# Patient Record
Sex: MALE | Race: WHITE | NOT HISPANIC OR LATINO | Employment: OTHER | URBAN - METROPOLITAN AREA
[De-identification: names, ages, dates, MRNs, and addresses within clinical notes are randomized per-mention and may not be internally consistent; named-entity substitution may affect disease eponyms.]

---

## 2020-06-24 ENCOUNTER — OFFICE VISIT CONVERTED (OUTPATIENT)
Dept: NEUROSURGERY | Facility: CLINIC | Age: 60
End: 2020-06-24
Attending: NEUROLOGICAL SURGERY

## 2020-07-10 ENCOUNTER — HOSPITAL ENCOUNTER (OUTPATIENT)
Dept: PHYSICAL THERAPY | Facility: CLINIC | Age: 60
Setting detail: RECURRING SERIES
Discharge: HOME | End: 2020-10-15
Attending: NEUROLOGICAL SURGERY

## 2020-08-28 ENCOUNTER — OFFICE VISIT CONVERTED (OUTPATIENT)
Dept: NEUROSURGERY | Facility: CLINIC | Age: 60
End: 2020-08-28
Attending: PHYSICIAN ASSISTANT

## 2021-05-10 NOTE — H&P
History and Physical      Patient Name: Harry Aponte   Patient ID: [id number]   Sex: Male   YOB: 1960    Referring Provider: Mildred ONOFRE    Visit Date: June 24, 2020    Provider: Dom Saenz MD   Location: [facility]   Location Address: [address] [place] [address]  [id number]   Location Phone: [phone]          Chief Complaint  · Back and left leg pain      History Of Present Illness  The patient is a 60 year old /White male, who presents on referral from Mildred ONOFRE, for a neurosurgical evaluation of low back pain.   The pain developed gradually several years ago. Then on first of Zelda develop dramatically increased back pain. currently improving. It is 2-3/10 now instead of 9-10/10 has a sharp and quality and radiates into the left anterior thigh and shin, but mainly in shin distribution. The pain has been waxing and waning in severity. The pain tends to be maximal at night. The patient states the pain is aggravated by walking long distances and climbing stairs. It is alleviated by fetal position, change in position..   He denies paresthesias in the right leg. The patient has no prior history of neck or back surgery.   RECENT INTERVENTIONS:  He has been previously treated with NSAIDs and and gabapentin..   INFORMATION REVIEWED:  The following information was reviewed: radiology reports and images and referring physicians notes. Flexion/extension lumbar radiographs and MRI of the lumbar spine revealed spinal stenosis, grade II spondylolisthesis, and degenerative disk disease. The stenosis is present at L4-5. The grade II spondylolisthesis involves the L4 vertebral body. The degenerative disc disease is present at multiple levels.       Past Medical History  Hyperlipidemia; Hypertension; Renal Calculus; Spondylolisthesis , lumbar         Past Surgical History  Colonoscopy         Medication List  amlodipine 5 mg oral tablet; Aspir-81 81 mg oral  "tablet,delayed release (DR/EC); citric acid (bulk) miscellaneous powder; gabapentin 300 mg oral capsule; naproxen 500 mg oral tablet; pravastatin 80 mg oral tablet         Allergy List  NO KNOWN DRUG ALLERGIES         Family Medical History  -None         Social History  Caffeine (Current every day); Second hand smoke exposure (Never); Tobacco (Former)         Review of Systems  · Constitutional  o Denies  o : chills, excessive sweating, fatigue, fever, sycope/passing out, weight gain, weight loss  · Eyes  o Denies  o : changes in vision, blurry vision, double vision  · HENT  o Denies  o : loss of hearing, ringing in the ears, ear aches, sore throat, nasal congestion, sinus pain, nose bleeds, seasonal allergies  · Cardiovascular  o Denies  o : blood clots, swollen legs, anemia, easy burising or bleeding, transfusions  · Respiratory  o Denies  o : shortness of breath, dry cough, productive cough, pneumonia, COPD  · Gastrointestinal  o Denies  o : difficulty swallowing, reflux  · Genitourinary  o Denies  o : incontinence  · Neurologic  o Admits  o : numbness/tingling/paresthesia , weakness  o Denies  o : headache, seizure, stroke, tremor, loss of balance, falls, dizziness/vertigo, difficulty with sleep, difficulty with coordination, difficulty with dexterity  · Musculoskeletal  o Admits  o : weakness, pain radiating in leg, low back pain  o Denies  o : neck stiffness/pain, swollen lymph nodes, muscle aches, joint pain, spasms, sciatica, pain radiating in arm  · Endocrine  o Denies  o : diabetes, thyroid disorder  · Psychiatric  o Denies  o : anxiety, depression      Vitals  Date Time BP Position Site L\R Cuff Size HR RR TEMP (F) WT  HT  BMI kg/m2 BSA m2 O2 Sat        06/24/2020 01:08 PM        98.1 206lbs 3oz 6'  1\" 27.2 2.19           Physical Examination  · Constitutional  o Appearance  o : well-nourished, well developed, alert, in no acute distress  · Respiratory  o Respiratory Effort  o : breathing " unlabored  · Cardiovascular  o Peripheral Vascular System  o :   § Extremities  § : no edema or cyanosis  · Musculoskeletal  o Spine  o :   § Inspection/Palpation  § : no spinal tenderness or misalignment  o Right Lower Extremity  o :   § Inspection/Palpation  § : no joint or limb tenderness to palpation, no edema present, no ecchymosis  § Joint Stability  § : joint stability within normal limits  § Range of Motion  § : range of motion normal, no joint crepitations present, no pain on motion, Soren's test negative  o Left Lower Extremity  o :   § Inspection/Palpation  § : no joint or limb tenderness to palpation, no edema present, no ecchymosis  § Joint Stability  § : joint stability within normal limits  § Range of Motion  § : range of motion normal, + pain on motion, Soren's test +  · Skin and Subcutaneous Tissue  o Extremities  o :   § Right Lower Extremity  § : no lesions or areas of discoloration  § Left Lower Extremity  § : no lesions or areas of discoloration  o Back  o : no lesions or areas of discoloration  · Neurologic  o Mental Status Examination  o :   § Orientation  § : alert and oriented to time, person, place and events  o Motor Examination  o :   § RLE Strength  § : strength normal  § RLE Motor Function  § : tone normal, no atrophy, no abnormal movements noted  § LLE Strength  § : strength normal  § LLE Motor Function  § : tone normal, no atrophy, no abnormal movements noted  o Reflexes  o :   § RLE  § : knee and ankle reflexes 0/4, SLR negative  § LLE  § : knee and ankle reflexes 0/4, SLR negative  o Sensation  o :   § Light Touch  § : sensation altered to light touch in left medial and anterior and lateral shin  o Gait and Station  o :   § Gait Screening  § : normal gait, able to stand without difficulty  · Psychiatric  o Mood and Affect  o : mood normal, affect appropriate     slight difficulty arising from seated position.           Assessment  · Lumbago/low back  pain     724.3/M54.40  chronic with episodic flairs. currently improving  · Lumbar disc herniation, L3-4     722.10/M51.26  · Lumbar Spinal Stenosis     724.02/M48.06  mostly foraminal at L4/5 left more than right secondary to spondy  · Spondylolisthesis , lumbar     738.4/M43.16  grade II at L4/5    Problems Reconciled  Plan  · Orders  o ACO-17: Screened for tobacco use AND received tobacco cessation intervention (4004F) - - 06/24/2020  o PHYSICAL THERAPY CONSULTATION (PHYTH) - 724.3/M54.40, 724.02/M48.06, 738.4/M43.16 - 06/24/2020  · Medications  o Medications have been Reconciled  o Transition of Care or Provider Policy  · Instructions  o Tobacco Cessation Counseling: Encouraged to stop smoking.   o Encouraged to follow-up with Primary Care Provider for preventative care.  o The ROS and the PFSH were reviewed at today's visit.  o I have discussed the risks and benefits of surgery versus physical therapy, epidural steroids, and other conservative forms of treatment.  o Non-operative back pain: The patient is neurologically intact and ambulatory, and the patient has been advised that surgical treatment is not the most appropriate intervention at this time. The treatment options have been discussed with the patient.   o I have recommended that the patient undergo physical therapy. We have discussed that almost 2/3 of patients will respond to physical therapy alone. We also discussed the role of non-steroidal anti-inflammatory medications.   o I explained that although 2/3 of people will have a favorable response to physical therapy, that means that 1/3 will not. Multiple courses of physical therapy do not tend to provide any additional benefit.  o Handouts provided: Non-Surgical Treatments for Back Pain/Degenerative Disc Disease.  o We have discussed the benefits of core strengthening. Patient will work on improving the core muscle strength with low impact activities such as walking, swimming, Pilates, etc.   o Call  or return to office if symptoms worsen or persist.  · Disposition  o Call or Return if symptoms worsen or persist.  o Imaging returned to patient.  o follow up 1 month            Electronically Signed by: Dom Saenz MD -Author on June 24, 2020 02:10:19 PM

## 2021-05-13 NOTE — PROGRESS NOTES
Progress Note      Patient Name: Harry Aponte   Patient ID: [id number]   Sex: Male   YOB: 1960    Referring Provider: Alicia Vogt MD    Visit Date: August 28, 2020    Provider: Dory Mendenhall PA-C   Location: [facility]   Location Address: [address] [place] [address]  [id number]   Location Phone: [phone]          Chief Complaint  · Follow-up      History Of Present Illness  The patient is a 60 year old /White male who is in the office for followup appointment.      His lbp and left leg pain have improved with PT.  He has some residual left lower leg paresthesias.  He stopped the gabapentin.  No new symptoms.  Played golf last weekend.  Pain is currently around a 2/10.       Past Medical History  Hyperlipidemia; Hypertension; Renal Calculus; Spondylolisthesis , lumbar         Past Surgical History  Colonoscopy         Medication List  amlodipine 5 mg oral tablet; Aspir-81 81 mg oral tablet,delayed release (DR/EC); citric acid (bulk) miscellaneous powder; naproxen 500 mg oral tablet; pravastatin 80 mg oral tablet         Allergy List  NO KNOWN DRUG ALLERGIES       Allergies Reconciled  Family Medical History  -None         Social History  Caffeine (Current every day); Second hand smoke exposure (Never); Tobacco (Former)         Review of Systems  · Constitutional  o Denies  o : chills, excessive sweating, fatigue, fever, sycope/passing out, weight gain, weight loss  · Eyes  o Denies  o : changes in vision, blurry vision, double vision  · HENT  o Denies  o : loss of hearing, ringing in the ears, ear aches, sore throat, nasal congestion, sinus pain, nose bleeds, seasonal allergies  · Cardiovascular  o Denies  o : blood clots, swollen legs, anemia, easy burising or bleeding, transfusions  · Respiratory  o Denies  o : shortness of breath, dry cough, productive cough, pneumonia, COPD  · Gastrointestinal  o Denies  o : difficulty swallowing,  "reflux  · Genitourinary  o Denies  o : incontinence  · Neurologic  o Admits  o : difficulty with sleep, paresthesias  o Denies  o : headache, seizure, stroke, tremor, loss of balance, falls, dizziness/vertigo, difficulty with coordination, difficulty with dexterity, weakness  · Musculoskeletal  o Admits  o : weakness, low back pain  o Denies  o : neck stiffness/pain, swollen lymph nodes, muscle aches, joint pain, spasms, sciatica, pain radiating in arm, pain radiating in leg  · Endocrine  o Denies  o : diabetes, thyroid disorder  · Psychiatric  o Denies  o : anxiety, depression  · All Others Negative      Vitals  Date Time BP Position Site L\R Cuff Size HR RR TEMP (F) WT  HT  BMI kg/m2 BSA m2 O2 Sat HC       08/28/2020 08:07 AM        97.1 205lbs 1oz 6'  1\" 27.05 2.19           Physical Examination  · Constitutional  o Appearance  o : well-nourished, well developed, alert, in no acute distress  · Respiratory  o Respiratory Effort  o : breathing unlabored  · Cardiovascular  o Peripheral Vascular System  o :   § Extremities  § : no cyanosis, clubbing or edema  · Neurologic  o Mental Status Examination  o :   § Orientation  § : grossly oriented to person, place and time  o Motor Examination  o :   § RLE Strength  § : strength normal  § RLE Motor Function  § : tone normal, no atrophy, no abnormal movements noted  § LLE Strength  § : strength normal  § LLE Motor Function  § : tone normal, no atrophy, no abnormal movements noted  o Reflexes  o :   § RLE  § : 0/4 knee and ankle reflex  § LLE  § : 0/4 knee and ankle reflex  o Sensation  o :   § Light Touch  § : sensation intact to light touch in extremities except diminished in the left anterior and posterior lower leg  o Gait and Station  o :   § Gait Screening  § : gait within normal limits  · Psychiatric  o Mood and Affect  o : mood normal, affect appropriate          Assessment  · Acquired spondylolisthesis , lumbar     738.4/M43.19  · Lumbago/low back " pain     724.3/M54.40  chronic with episodic flairs. currently improving  · Lumbar disc herniation, L3-4     722.10/M51.26  · Lumbar Spinal Stenosis     724.02/M48.06  mostly foraminal at L4/5 left more than right secondary to spondy    Problems Reconciled  Plan  · Medications  o Medications have been Reconciled  o Transition of Care or Provider Policy  · Instructions  o The ROS and the PFSH were reviewed at today's visit.  o Call or return to office if symptoms worsen or persist.   o He has improved with PT. He will continue home PT exercises and f/u as needed. He has grade II spondylolisthesis but surgery not recommended at this time since he has improved and he does not have a history of chronic lbp.             Electronically Signed by: Dory Mendenhall PA-C -Author on August 28, 2020 08:31:51 AM

## 2021-05-14 VITALS — WEIGHT: 205.06 LBS | BODY MASS INDEX: 27.18 KG/M2 | TEMPERATURE: 97.1 F | HEIGHT: 73 IN

## 2021-05-15 VITALS — BODY MASS INDEX: 27.33 KG/M2 | HEIGHT: 73 IN | WEIGHT: 206.19 LBS | TEMPERATURE: 98.1 F

## 2022-09-30 ENCOUNTER — PREP FOR SURGERY (OUTPATIENT)
Dept: OTHER | Facility: HOSPITAL | Age: 62
End: 2022-09-30

## 2022-09-30 ENCOUNTER — CLINICAL SUPPORT (OUTPATIENT)
Dept: GASTROENTEROLOGY | Facility: CLINIC | Age: 62
End: 2022-09-30

## 2022-09-30 DIAGNOSIS — Z12.11 COLON CANCER SCREENING: Primary | ICD-10-CM

## 2022-09-30 RX ORDER — ASPIRIN 81 MG/1
TABLET ORAL
COMMUNITY

## 2022-09-30 RX ORDER — NAPROXEN 500 MG/1
TABLET ORAL
COMMUNITY

## 2022-09-30 RX ORDER — GABAPENTIN 300 MG/1
CAPSULE ORAL
COMMUNITY
Start: 2020-06-01

## 2022-09-30 RX ORDER — TRAZODONE HYDROCHLORIDE 50 MG/1
TABLET ORAL
COMMUNITY

## 2022-09-30 RX ORDER — PRAVASTATIN SODIUM 80 MG/1
TABLET ORAL
COMMUNITY

## 2022-09-30 RX ORDER — AMLODIPINE BESYLATE 5 MG/1
TABLET ORAL
COMMUNITY

## 2022-09-30 RX ORDER — PEG-3350, SODIUM SULFATE, SODIUM CHLORIDE, POTASSIUM CHLORIDE, SODIUM ASCORBATE AND ASCORBIC ACID 7.5-2.691G
1000 KIT ORAL EVERY 12 HOURS
Qty: 1000 ML | Refills: 0 | Status: SHIPPED | OUTPATIENT
Start: 2022-09-30 | End: 2022-10-06 | Stop reason: SDUPTHER

## 2022-09-30 NOTE — PROGRESS NOTES
Harry Aponte  REASON FOR CALL: SCREENING CALL, LAST COLON UNKNOWN  SENT IN PREP: MOVIPREP  DOS: 01.04.2022    Past Medical History:   Diagnosis Date   • Chronic kidney disease 01/01/2005    Hx of kidney stones   • Colon polyp 01/01/2012   • Hyperlipidemia 01/01/2000   • Hypertension 02/01/2017     No Known Allergies  Past Surgical History:   Procedure Laterality Date   • COLONOSCOPY  02/14/2000     Social History     Socioeconomic History   • Marital status:    Tobacco Use   • Smoking status: Former Smoker     Packs/day: 1.00     Years: 10.00     Pack years: 10.00     Types: Electronic Cigarette     Start date: 1/1/2012   • Smokeless tobacco: Never Used   • Tobacco comment: Current use of e-cigs. Past hx of cigar use   Vaping Use   • Vaping Use: Every day   • Substances: Nicotine, Flavoring   • Devices: Pre-filled or refillable cartridge, Refillable tank   • Passive vaping exposure: Yes   Substance and Sexual Activity   • Alcohol use: Not Currently   • Drug use: Not Currently   • Sexual activity: Yes     Partners: Female     Family History   Problem Relation Age of Onset   • Colon cancer Neg Hx        Current Outpatient Medications:   •  amLODIPine (NORVASC) 5 MG tablet, amlodipine 5 mg oral tablet take 1 tablet (5 mg) by oral route once daily   Active, Disp: , Rfl:   •  aspirin 81 MG EC tablet, Aspir-81 81 mg oral tablet,delayed release (DR/EC) take 1 tablet (81 mg) by oral route once daily   Active, Disp: , Rfl:   •  gabapentin (NEURONTIN) 300 MG capsule, gabapentin 300 mg oral capsule take 1 capsule by oral route 2 times a day   Suspended, Disp: , Rfl:   •  naproxen (NAPROSYN) 500 MG tablet, naproxen 500 mg oral tablet take 1 tablet (500 mg) by oral route 2 times per day with food   Active, Disp: , Rfl:   •  pravastatin (PRAVACHOL) 80 MG tablet, pravastatin 80 mg oral tablet take 1 tablet (80 mg) by oral route once daily   Active, Disp: , Rfl:   •  Sod Citrate-Citric Acid (CITRIC ACID-SODIUM CITRATE PO),  , Disp: , Rfl:   •  traZODone (DESYREL) 25 MG half tablet, , Disp: , Rfl:     Answers for HPI/ROS submitted by the patient on 9/24/2022  What is the primary reason for your visit?: Other  Please describe your symptoms.: No symptoms. Colonoscopy needed.  Have you had these symptoms before?: No  How long have you been having these symptoms?: Greater than 2 weeks  Please list any medications you are currently taking for this condition.: None  Please describe any probable cause for these symptoms. : N/A

## 2022-10-06 RX ORDER — SODIUM PICOSULFATE, MAGNESIUM OXIDE, AND ANHYDROUS CITRIC ACID 10; 3.5; 12 MG/160ML; G/160ML; G/160ML
160 LIQUID ORAL TAKE AS DIRECTED
Qty: 320 ML | Refills: 0 | Status: ON HOLD | COMMUNITY
Start: 2022-10-06 | End: 2023-01-04

## 2022-10-10 ENCOUNTER — TELEPHONE (OUTPATIENT)
Dept: GASTROENTEROLOGY | Facility: CLINIC | Age: 62
End: 2022-10-10

## 2022-10-10 NOTE — TELEPHONE ENCOUNTER
Spoke to pt, he stated that his dr was sending him in a bowel prep, he will no loner need our sample of clinpiq, will put back. Citlali

## 2022-12-21 ENCOUNTER — TELEPHONE (OUTPATIENT)
Dept: GASTROENTEROLOGY | Facility: CLINIC | Age: 62
End: 2022-12-21

## 2022-12-21 NOTE — TELEPHONE ENCOUNTER
I spoke with Mr Aponte, confirmed his scheduled Colonoscopy on 01/04/2023, arrival time of 7:00am. Reminded of liquid diet the day prior. Reminded of bowel prep and instructions. Voiced understanding. kannan

## 2023-01-03 ENCOUNTER — TELEPHONE (OUTPATIENT)
Dept: GASTROENTEROLOGY | Facility: CLINIC | Age: 63
End: 2023-01-03
Payer: OTHER GOVERNMENT

## 2023-01-03 NOTE — TELEPHONE ENCOUNTER
I spoke with pt. He states he has his bowel prep and has been on clear liquid diet today. Pt did not have any questions regarding instructions.

## 2023-01-04 ENCOUNTER — ANESTHESIA (OUTPATIENT)
Dept: GASTROENTEROLOGY | Facility: HOSPITAL | Age: 63
End: 2023-01-04
Payer: OTHER GOVERNMENT

## 2023-01-04 ENCOUNTER — HOSPITAL ENCOUNTER (OUTPATIENT)
Facility: HOSPITAL | Age: 63
Setting detail: HOSPITAL OUTPATIENT SURGERY
Discharge: HOME | End: 2023-01-04
Attending: INTERNAL MEDICINE | Admitting: INTERNAL MEDICINE
Payer: OTHER GOVERNMENT

## 2023-01-04 ENCOUNTER — ANESTHESIA EVENT (OUTPATIENT)
Dept: GASTROENTEROLOGY | Facility: HOSPITAL | Age: 63
End: 2023-01-04
Payer: OTHER GOVERNMENT

## 2023-01-04 VITALS
RESPIRATION RATE: 18 BRPM | HEIGHT: 73 IN | HEART RATE: 69 BPM | OXYGEN SATURATION: 97 % | SYSTOLIC BLOOD PRESSURE: 147 MMHG | DIASTOLIC BLOOD PRESSURE: 60 MMHG | BODY MASS INDEX: 28.17 KG/M2 | TEMPERATURE: 98 F | WEIGHT: 212.52 LBS

## 2023-01-04 DIAGNOSIS — Z12.11 COLON CANCER SCREENING: ICD-10-CM

## 2023-01-04 PROCEDURE — 88305 TISSUE EXAM BY PATHOLOGIST: CPT | Performed by: INTERNAL MEDICINE

## 2023-01-04 PROCEDURE — 45385 COLONOSCOPY W/LESION REMOVAL: CPT | Performed by: INTERNAL MEDICINE

## 2023-01-04 PROCEDURE — 25010000002 PROPOFOL 10 MG/ML EMULSION: Performed by: NURSE ANESTHETIST, CERTIFIED REGISTERED

## 2023-01-04 RX ORDER — LIDOCAINE HYDROCHLORIDE 20 MG/ML
INJECTION, SOLUTION EPIDURAL; INFILTRATION; INTRACAUDAL; PERINEURAL AS NEEDED
Status: DISCONTINUED | OUTPATIENT
Start: 2023-01-04 | End: 2023-01-04 | Stop reason: SURG

## 2023-01-04 RX ORDER — SODIUM CHLORIDE, SODIUM LACTATE, POTASSIUM CHLORIDE, CALCIUM CHLORIDE 600; 310; 30; 20 MG/100ML; MG/100ML; MG/100ML; MG/100ML
30 INJECTION, SOLUTION INTRAVENOUS CONTINUOUS
Status: DISCONTINUED | OUTPATIENT
Start: 2023-01-04 | End: 2023-01-04 | Stop reason: HOSPADM

## 2023-01-04 RX ORDER — PROPOFOL 10 MG/ML
VIAL (ML) INTRAVENOUS AS NEEDED
Status: DISCONTINUED | OUTPATIENT
Start: 2023-01-04 | End: 2023-01-04 | Stop reason: SURG

## 2023-01-04 RX ADMIN — PROPOFOL 200 MCG/KG/MIN: 10 INJECTION, EMULSION INTRAVENOUS at 08:53

## 2023-01-04 RX ADMIN — SODIUM CHLORIDE, POTASSIUM CHLORIDE, SODIUM LACTATE AND CALCIUM CHLORIDE 30 ML/HR: 600; 310; 30; 20 INJECTION, SOLUTION INTRAVENOUS at 07:38

## 2023-01-04 RX ADMIN — PROPOFOL 100 MG: 10 INJECTION, EMULSION INTRAVENOUS at 08:53

## 2023-01-04 RX ADMIN — LIDOCAINE HYDROCHLORIDE 30 MG: 20 INJECTION, SOLUTION EPIDURAL; INFILTRATION; INTRACAUDAL; PERINEURAL at 08:53

## 2023-01-04 NOTE — H&P
ScreeningPre Procedure History & Physical    Chief Complaint:   Screening     Subjective     HPI:   Screening     Past Medical History:   Past Medical History:   Diagnosis Date   • Chronic kidney disease 01/01/2005    Hx of kidney stones   • Colon polyp 01/01/2012   • Hyperlipidemia 01/01/2000   • Hypertension 02/01/2017       Past Surgical History:  Past Surgical History:   Procedure Laterality Date   • COLONOSCOPY  02/14/2000       Family History:  Family History   Problem Relation Age of Onset   • Colon cancer Neg Hx        Social History:   reports that he has quit smoking. His smoking use included electronic cigarette and cigarettes. He started smoking about 11 years ago. He has a 10.00 pack-year smoking history. He has never used smokeless tobacco. He reports that he does not currently use alcohol. He reports that he does not currently use drugs.    Medications:   Medications Prior to Admission   Medication Sig Dispense Refill Last Dose   • amLODIPine (NORVASC) 5 MG tablet amlodipine 5 mg oral tablet take 1 tablet (5 mg) by oral route once daily   Active   Past Week   • citric acid-potassium citrate (POLYCITRA) 1100-334 MG/5ML solution Take  by mouth 3 (Three) Times a Day With Meals.   Past Week   • gabapentin (NEURONTIN) 300 MG capsule gabapentin 300 mg oral capsule take 1 capsule by oral route 2 times a day   Suspended   Past Week   • pravastatin (PRAVACHOL) 80 MG tablet pravastatin 80 mg oral tablet take 1 tablet (80 mg) by oral route once daily   Active   Past Week   • traZODone (DESYREL) 25 MG half tablet    Past Week   • aspirin 81 MG EC tablet Aspir-81 81 mg oral tablet,delayed release (DR/EC) take 1 tablet (81 mg) by oral route once daily   Active   1/2/2023   • naproxen (NAPROSYN) 500 MG tablet naproxen 500 mg oral tablet take 1 tablet (500 mg) by oral route 2 times per day with food   Active   More than a month       Allergies:  Patient has no known allergies.        Objective     Blood pressure  155/93, pulse 64, temperature 98.2 °F (36.8 °C), temperature source Temporal, resp. rate 18, height 185.4 cm (73\"), weight 96.4 kg (212 lb 8.4 oz), SpO2 97 %.    Physical Exam   Constitutional: Pt is oriented to person, place, and time and well-developed, well-nourished, and in no distress.   Mouth/Throat: Oropharynx is clear and moist.   Neck: Normal range of motion.   Cardiovascular: Normal rate, regular rhythm and normal heart sounds.    Pulmonary/Chest: Effort normal and breath sounds normal.   Abdominal: Soft. Nontender  Skin: Skin is warm and dry.   Psychiatric: Mood, memory, affect and judgment normal.     Assessment & Plan     Diagnosis:  Screening colonoscopy  H/o colon polyps     Anticipated Surgical Procedure:  colonoscopy    The risks, benefits, and alternatives of this procedure have been discussed with the patient or the responsible party- the patient understands and agrees to proceed.

## 2023-01-04 NOTE — ANESTHESIA POSTPROCEDURE EVALUATION
Patient: Harry Aponte    Procedure Summary     Date: 01/04/23 Room / Location: [facility] ENDOSCOPY 2 / [facility] ENDOSCOPY    Anesthesia Start: 0849 Anesthesia Stop: 0921    Procedure: COLONOSCOPY with polypectomy with cold snare Diagnosis:       Colon cancer screening      (Colon cancer screening [Z12.11])    Surgeons: Thaddeus Alaniz MD Provider: Samir Rowland MD    Anesthesia Type: general ASA Status: 2          Anesthesia Type: general    Vitals  Vitals Value Taken Time   /55 01/04/23 0935   Temp 36.7 °C (98 °F) 01/04/23 0920   Pulse 62 01/04/23 0935   Resp 17 01/04/23 0935   SpO2 98 % 01/04/23 0935           Post Anesthesia Care and Evaluation    Patient location during evaluation: bedside  Patient participation: complete - patient participated  Level of consciousness: awake  Pain management: adequate    Airway patency: patent  PONV Status: none  Cardiovascular status: acceptable  Respiratory status: acceptable  Hydration status: acceptable    Comments: An Anesthesiologist personally participated in the most demanding procedures (including induction and emergence if applicable) in the anesthesia plan, monitored the course of anesthesia administration at frequent intervals and remained physically present and available for immediate diagnosis and treatment of emergencies.

## 2023-01-04 NOTE — ANESTHESIA PREPROCEDURE EVALUATION
Anesthesia Evaluation     Patient summary reviewed and Nursing notes reviewed                Airway   Mallampati: I  TM distance: >3 FB  Neck ROM: full  No difficulty expected  Dental      Pulmonary - negative pulmonary ROS and normal exam    breath sounds clear to auscultation  Cardiovascular - normal exam    Rhythm: regular  Rate: normal    (+) hypertension, hyperlipidemia,       Neuro/Psych- negative ROS  GI/Hepatic/Renal/Endo    (+)   renal disease,     Musculoskeletal (-) negative ROS    Abdominal    Substance History - negative use     OB/GYN negative ob/gyn ROS         Other                        Anesthesia Plan    ASA 2     general     intravenous induction     Anesthetic plan, risks, benefits, and alternatives have been provided, discussed and informed consent has been obtained with: patient.        CODE STATUS:

## 2023-01-05 LAB
CYTO UR: NORMAL
LAB AP CASE REPORT: NORMAL
LAB AP CLINICAL INFORMATION: NORMAL
PATH REPORT.FINAL DX SPEC: NORMAL
PATH REPORT.GROSS SPEC: NORMAL

## (undated) DEVICE — CONN JET HYDRA H20 AUXILIARY DISP

## (undated) DEVICE — Device

## (undated) DEVICE — SNAR POLYP CAPTIFLEX XS/OVL 11X2.4MM 240CM 1P/U

## (undated) DEVICE — SOLIDIFIER LIQLOC PLS 1500CC BT

## (undated) DEVICE — SOL IRRG H2O PL/BG 1000ML STRL

## (undated) DEVICE — Device: Brand: DEFENDO AIR/WATER/SUCTION AND BIOPSY VALVE

## (undated) DEVICE — LINER SURG CANSTR SXN S/RIGD 1500CC

## (undated) DEVICE — THE SINGLE USE ETRAP – POLYP TRAP IS USED FOR SUCTION RETRIEVAL OF ENDOSCOPICALLY REMOVED POLYPS.: Brand: ETRAP